# Patient Record
Sex: MALE | Race: WHITE | NOT HISPANIC OR LATINO | Employment: OTHER | URBAN - NONMETROPOLITAN AREA
[De-identification: names, ages, dates, MRNs, and addresses within clinical notes are randomized per-mention and may not be internally consistent; named-entity substitution may affect disease eponyms.]

---

## 2018-03-16 ENCOUNTER — OFFICE VISIT (OUTPATIENT)
Dept: URGENT CARE | Facility: PHYSICIAN GROUP | Age: 68
End: 2018-03-16
Payer: MEDICARE

## 2018-03-16 VITALS
RESPIRATION RATE: 16 BRPM | HEART RATE: 80 BPM | SYSTOLIC BLOOD PRESSURE: 154 MMHG | OXYGEN SATURATION: 92 % | TEMPERATURE: 99.3 F | DIASTOLIC BLOOD PRESSURE: 82 MMHG

## 2018-03-16 DIAGNOSIS — R23.3 PETECHIAE: ICD-10-CM

## 2018-03-16 DIAGNOSIS — D69.2 PURPURA (HCC): ICD-10-CM

## 2018-03-16 DIAGNOSIS — Z78.9: Primary | ICD-10-CM

## 2018-03-16 PROCEDURE — 99203 OFFICE O/P NEW LOW 30 MIN: CPT | Performed by: PHYSICIAN ASSISTANT

## 2018-03-16 RX ORDER — METHYLPREDNISOLONE 4 MG/1
4 TABLET ORAL DAILY
COMMUNITY

## 2018-03-16 RX ORDER — CEFDINIR 300 MG/1
300 CAPSULE ORAL 2 TIMES DAILY
COMMUNITY

## 2018-03-16 NOTE — PROGRESS NOTES
Chief Complaint   Patient presents with   • Rash       HISTORY OF PRESENT ILLNESS: Patient is a 67 y.o. male who presents today because he has multiple complaints. He was seen at another facility yesterday, because he had jaw swelling on the left side, was diagnosed with sinus infection, put on antibiotics, steroids. He also had blotches on his lower extremities bilaterally. He states that the jaw swelling on the left-hand side has gotten somewhat better. He is not sure if he still has a sinus infection and purple spots on his lower extremities have gotten much worse and her radiating up his legs.    Patient Active Problem List    Diagnosis Date Noted   • URI (upper respiratory infection) 04/01/2014   • Ulcer of left medial lower extremity (CMS-HCC) 11/20/2013   • Knee pain 07/18/2013   • Diabetes (CMS-HCC) 11/23/2011   • History of DVT (deep vein thrombosis) 11/23/2011   • History of prostate cancer 11/23/2011   • History of prostatectomy 11/23/2011   • HTN (hypertension) 11/23/2011   • Dyslipidemia 11/23/2011   • PTSD (post-traumatic stress disorder) 11/23/2011   • Vitamin d deficiency 11/23/2011   • Chronic low back pain 11/23/2011       Allergies:Pcn [penicillins]    Current Outpatient Prescriptions [facility]   Medication Sig Dispense Refill   • cefdinir (OMNICEF) 300 MG Cap Take 300 mg by mouth 2 times a day.     • methylPREDNISolone (MEDROL) 4 MG Tab Take 4 mg by mouth every day.     • Tetrahydroz-Dextran-PEG-Povid (EYE DROPS ADVANCED RELIEF OP) by Ophthalmic route.     • sitagliptin (JANUVIA) 100 MG TABS Take 100 mg by mouth every day.     • pregabalin (LYRICA) 100 MG CAPS Take 100 mg by mouth 2 times a day.     • metformin ER (GLUCOPHAGE XR) 500 MG TB24 Take 500 mg by mouth every day.     • oxycodone immediate-release (ROXICODONE) 5 MG TABS Take 5 mg by mouth every four hours as needed for Severe Pain.     • Olopatadine HCl (PATADAY) 0.2 % SOLN by Ophthalmic route.     • dabigatran (PRADAXA) 150 MG  CAPS capsule Take 150 mg by mouth 2 Times a Day.     • triamcinolone acetonide (KENALOG) 0.1 % CREA Apply  to affected area(s) 2 times a day.     • tizanidine (ZANAFLEX) 4 MG TABS Take 1 Tab by mouth every 6 hours as needed. 30 Tab 3   • flunisolide (NASALIDE) 0.025 % SOLN Spray 2 Sprays in nose 2 Times a Day.     • hydrOXYzine (ATARAX) 25 MG TABS Take 25 mg by mouth 3 times a day as needed. Indications: Tension     • buPROPion (WELLBUTRIN) 100 MG TABS Take 100 mg by mouth 2 times a day.     • Insulin Aspart Prot & Aspart (NOVOLOG MIX 70/30 SC) Inject 90 Units as instructed 2 Times a Day.     • acetaminophen (TYLENOL) 325 MG TABS Take 650 mg by mouth every four hours as needed.     • aspirin (ASA) 81 MG CHEW Take 81 mg by mouth every day.     • atenolol (TENORMIN) 50 MG TABS Take 50 mg by mouth every day.     • Cholecalciferol 1000 UNIT CAPS Take  by mouth.     • cyclobenzaprine (FLEXERIL) 10 MG TABS Take 10 mg by mouth 3 times a day as needed.     • fenofibrate (TRICOR) 54 MG tablet Take 54 mg by mouth every day.     • hydrochlorothiazide (HYDRODIURIL) 25 MG TABS Take 25 mg by mouth every day.     • lisinopril (PRINIVIL, ZESTRIL) 40 MG tablet Take 40 mg by mouth every day.     • loratadine (CLARITIN) 10 MG TABS Take 10 mg by mouth every day.     • simvastatin (ZOCOR) 20 MG TABS Take 20 mg by mouth every evening.     • testosterone cypionate (DEPOTESTOTERONE CYPIONATE) 200 MG/ML OIL 50 mg by Intramuscular route Once.     • venlafaxine (EFFEXOR) 100 MG tablet Take 100 mg by mouth 3 times a day.     • warfarin (COUMADIN) 5 MG TABS Take 5 mg by mouth every day.       No current [facility]-ordered facility-administered medications on file.        Past Medical History:   Diagnosis Date   • DIABETES MELLITUS 11/23/2011   • Dyslipidemia 11/23/2011   • History of prostate cancer 11/23/2011   • History of prostatectomy 11/23/2011   • HTN (hypertension) 11/23/2011   • Knee pain 7/18/2013   • PTSD (post-traumatic stress disorder)  2011   • Vitamin d deficiency 2011       Social History   Substance Use Topics   • Smoking status: Former Smoker     Years: 0.00     Quit date: 2011   • Smokeless tobacco: Never Used   • Alcohol use Yes      Comment: occasionally       Family Status   Relation Status   • Mother    • Father    • Other Alive     Family History   Problem Relation Age of Onset   • Diabetes Other    • Hypertension Other        ROS:  Review of Systems   Constitutional: Negative for fever, chills, weight loss and malaise/fatigue.   Genitourinary: Negative for dysuria, urgency and frequency.     Exam:  Blood pressure 154/82, pulse 80, temperature 37.4 °C (99.3 °F), resp. rate 16, SpO2 92 %.  General:  Well nourished, well developed male in NAD  Head:Normocephalic, atraumatic  Eyes: PERRLA, EOM within normal limits, no conjunctival injection, no scleral icterus, visual fields and acuity grossly intact.  Extremities: no clubbing, cyanosis, or edema. Visible purpura and petechiae throughout his entire lower extremities    Please note that this dictation was created using voice recognition software. I have made every reasonable attempt to correct obvious errors, but I expect that there are errors of grammar and possibly content that I did not discover before finalizing the note.    Assessment/Plan:  1. Combative reaction     2. Purpura (CMS-HCC)     3. Petechiae     Patient refused height and weight recordings from my medical assistant. I asked the patient to step on the scale and he got very angry, combative, argumentative, started using expletives.  I tried to explain that he may need to go to the emergency room for further evaluation and workup due to the petechia and purpura as I do not have laboratory evaluation on a stat basis here, labs can't even be drawn until tomorrow morning.  This seemed to irritate the patient even further, I made attempts to calm the patient down, and ask about his symptomatology  "and he continued to say \"none of you fucking people know anything\", \" I dont want to Fucking go to the ER\" . At that point I told the patient that our visit was over and he was free to leave, recommended he go to emergency room.           "

## 2018-04-11 ENCOUNTER — APPOINTMENT (RX ONLY)
Dept: URBAN - METROPOLITAN AREA CLINIC 4 | Facility: CLINIC | Age: 68
Setting detail: DERMATOLOGY
End: 2018-04-11

## 2018-04-11 DIAGNOSIS — L30.9 DERMATITIS, UNSPECIFIED: ICD-10-CM

## 2018-04-11 PROBLEM — F32.9 MAJOR DEPRESSIVE DISORDER, SINGLE EPISODE, UNSPECIFIED: Status: ACTIVE | Noted: 2018-04-11

## 2018-04-11 PROBLEM — E13.9 OTHER SPECIFIED DIABETES MELLITUS WITHOUT COMPLICATIONS: Status: ACTIVE | Noted: 2018-04-11

## 2018-04-11 PROBLEM — I10 ESSENTIAL (PRIMARY) HYPERTENSION: Status: ACTIVE | Noted: 2018-04-11

## 2018-04-11 PROBLEM — E78.5 HYPERLIPIDEMIA, UNSPECIFIED: Status: ACTIVE | Noted: 2018-04-11

## 2018-04-11 PROCEDURE — ? COUNSELING

## 2018-04-11 PROCEDURE — 99202 OFFICE O/P NEW SF 15 MIN: CPT | Mod: 25

## 2018-04-11 PROCEDURE — ? BIOPSY BY SHAVE METHOD

## 2018-04-11 PROCEDURE — 11100: CPT

## 2018-04-11 PROCEDURE — ? DIAGNOSIS COMMENT

## 2018-04-11 PROCEDURE — ? ADDITIONAL NOTES

## 2018-04-11 PROCEDURE — 11101: CPT

## 2018-04-11 ASSESSMENT — LOCATION SIMPLE DESCRIPTION DERM
LOCATION SIMPLE: RIGHT KNEE
LOCATION SIMPLE: LEFT PRETIBIAL REGION
LOCATION SIMPLE: RIGHT PRETIBIAL REGION
LOCATION SIMPLE: LEFT FOOT
LOCATION SIMPLE: LEFT KNEE
LOCATION SIMPLE: RIGHT FOOT

## 2018-04-11 ASSESSMENT — LOCATION DETAILED DESCRIPTION DERM
LOCATION DETAILED: LEFT DORSAL FOOT
LOCATION DETAILED: LEFT DISTAL PRETIBIAL REGION
LOCATION DETAILED: RIGHT PROXIMAL PRETIBIAL REGION
LOCATION DETAILED: RIGHT DORSAL FOOT
LOCATION DETAILED: RIGHT KNEE
LOCATION DETAILED: LEFT KNEE

## 2018-04-11 ASSESSMENT — LOCATION ZONE DERM
LOCATION ZONE: LEG
LOCATION ZONE: FEET

## 2018-04-11 NOTE — HPI: RASH
How Severe Is Your Rash?: moderate
Is This A New Presentation, Or A Follow-Up?: Rash
Additional History: Patient had recent surgery for deviated septum in late February 2018. He is also taking a new insulin Humalog.

## 2018-04-11 NOTE — PROCEDURE: BIOPSY BY SHAVE METHOD
Notification Instructions: Patient will be notified of biopsy results. However, patient instructed to call the office if not contacted within 2 weeks.
Lab: [id number]
Cryotherapy Text: The wound bed was treated with cryotherapy after the biopsy was performed.
Curettage Text: The wound bed was treated with curettage after the biopsy was performed.
Electrodesiccation Text: The wound bed was treated with electrodesiccation after the biopsy was performed.
Render Post-Care Instructions In Note?: no
Dressing: bandage
Detail Level: Detailed
Was A Bandage Applied: Yes
Anesthesia Type: 1% lidocaine with 1:100,000 epinephrine and 408mcg clindamycin/ml and a 1:10 solution of 8.4% sodium bicarbonate
Consent: Written consent was obtained and risks were reviewed including but not limited to scarring, infection, bleeding, scabbing, incomplete removal, nerve damage and allergy to anesthesia.
X Size Of Lesion In Cm: 0
Wound Care: Petrolatum
Biopsy Type: H and E
Lab Facility: [id number]
Type Of Destruction Used: Curettage
Post-Care Instructions: I reviewed with the patient in detail post-care instructions. Patient is to keep the biopsy site dry overnight, and then apply bacitracin twice daily until healed. Patient may apply hydrogen peroxide soaks to remove any crusting.
Anesthesia Volume In Cc: 0.5
Hemostasis: Drysol
Electrodesiccation And Curettage Text: The wound bed was treated with electrodesiccation and curettage after the biopsy was performed.
Biopsy Method: Dermablade
Size Of Lesion In Cm: 0.4
Billing Type: Third-Party Bill
Silver Nitrate Text: The wound bed was treated with silver nitrate after the biopsy was performed.
Size Of Lesion In Cm: 0.6

## 2018-04-11 NOTE — PROCEDURE: DIAGNOSIS COMMENT
Detail Level: Simple
Comment: Small vessel vasculitis considered, however appearance of healing lesions and additional lesions somewhat inconsistent with vasculitits at this time. Several lesions have a lichenoid appearance. Biopsies obtained today.

## 2018-04-11 NOTE — PROCEDURE: ADDITIONAL NOTES
Additional Notes: Patient advised to continue prednisone taper. \\nPatient advised once he is off of the prednisone taper that rash may reoccur.
Additional Notes: Patient to continue prednisone as prescribed at [facility], will monitor closely. Patient to monitor glucose closely. Patient to keep healing wounds moist with vaseline.\\n\\nCase discussed and patient examined by Dr. Bullard